# Patient Record
Sex: FEMALE | URBAN - METROPOLITAN AREA
[De-identification: names, ages, dates, MRNs, and addresses within clinical notes are randomized per-mention and may not be internally consistent; named-entity substitution may affect disease eponyms.]

---

## 2022-03-21 ENCOUNTER — OFFICE VISIT (OUTPATIENT)
Dept: URBAN - METROPOLITAN AREA CLINIC 96 | Facility: CLINIC | Age: 42
End: 2022-03-21
Payer: COMMERCIAL

## 2022-03-21 ENCOUNTER — DASHBOARD ENCOUNTERS (OUTPATIENT)
Age: 42
End: 2022-03-21

## 2022-03-21 VITALS
DIASTOLIC BLOOD PRESSURE: 75 MMHG | HEIGHT: 68 IN | SYSTOLIC BLOOD PRESSURE: 95 MMHG | HEART RATE: 83 BPM | BODY MASS INDEX: 30.07 KG/M2 | WEIGHT: 198.4 LBS | TEMPERATURE: 96.8 F

## 2022-03-21 DIAGNOSIS — R14.0 BLOATING: ICD-10-CM

## 2022-03-21 DIAGNOSIS — K58.1 IRRITABLE BOWEL SYNDROME WITH CONSTIPATION: ICD-10-CM

## 2022-03-21 DIAGNOSIS — R10.84 GENERALIZED ABDOMINAL PAIN: ICD-10-CM

## 2022-03-21 PROBLEM — 440630006: Status: ACTIVE | Noted: 2022-03-21

## 2022-03-21 PROCEDURE — 99204 OFFICE O/P NEW MOD 45 MIN: CPT | Performed by: INTERNAL MEDICINE

## 2022-03-21 RX ORDER — CHOLECALCIFEROL (VITAMIN D3) 50 MCG
1 TABLET TABLET ORAL ONCE A DAY
Status: ACTIVE | COMMUNITY

## 2022-03-21 RX ORDER — DEXTROAMPHETAMINE/AMPHETAMINE 10 MG
1 CAPSULE IN THE MORNING CAPSULE, EXT RELEASE 24 HR ORAL ONCE A DAY
Status: ACTIVE | COMMUNITY

## 2022-03-21 RX ORDER — BUPROPION HYDROCHLORIDE 150 MG/1
1 TABLET IN THE MORNING TABLET, EXTENDED RELEASE ORAL ONCE A DAY
Status: ACTIVE | COMMUNITY

## 2022-03-21 RX ORDER — LEVOTHYROXINE SODIUM 50 UG/1
1 TABLET IN THE MORNING ON AN EMPTY STOMACH TABLET ORAL ONCE A DAY
Status: ACTIVE | COMMUNITY

## 2022-03-21 NOTE — PHYSICAL EXAM CONSTITUTIONAL:
well developed, well nourished , in no acute distress , ambulating without difficulty , normal communication ability, flat affect

## 2022-03-21 NOTE — HPI-TODAY'S VISIT:
40 yo female patient presents self referred for abdominal pain. Was seen at [facility] ER last week. CT was done, patient does not know results. Discharged from ER and advised to follow up with GI MD. Took mag citrate at discharge.   Patient reports has been having constipation and difficulty with BM, which has been a chronic issue. Prior colonoscopy done 2016 with outside GI MD normal exam per patient , was diagnosed with C-IBS. Treated with Linzess 290 mcg which did help, but was "exhausting due to diarrhea side effect". Also tried Amitiza with no response.   Reports constipation is worsening. BM up to 4 days between. Denies any rectal bleeding, no unintentional weight loss. No family hx of GI CA.   Reports hx of anxiety/bipolar disorder/ADHD. Denies being followed by psych or therapist. Followed by primary MD who provides rx for such. Reports recent dose decrease of thyroid medications.--Hashimoto's hx.  Normal formed stool yesterday. No chance pregnant. Not trying to get pregnant. Due to follow up with GYN for future contraception initiation.